# Patient Record
Sex: MALE | Race: OTHER | Employment: PART TIME | URBAN - METROPOLITAN AREA
[De-identification: names, ages, dates, MRNs, and addresses within clinical notes are randomized per-mention and may not be internally consistent; named-entity substitution may affect disease eponyms.]

---

## 2018-10-26 ENCOUNTER — HOSPITAL ENCOUNTER (EMERGENCY)
Age: 53
Discharge: HOME | End: 2018-10-26
Attending: EMERGENCY MEDICINE
Payer: SELF-PAY

## 2018-10-26 VITALS
TEMPERATURE: 98.5 F | SYSTOLIC BLOOD PRESSURE: 151 MMHG | DIASTOLIC BLOOD PRESSURE: 90 MMHG | OXYGEN SATURATION: 100 % | HEART RATE: 71 BPM | WEIGHT: 145.5 LBS | RESPIRATION RATE: 16 BRPM

## 2018-10-26 DIAGNOSIS — S61.209A AVULSION OF FINGER TIP, INITIAL ENCOUNTER: Primary | ICD-10-CM

## 2018-10-26 DIAGNOSIS — Z23 NEED FOR TETANUS BOOSTER: ICD-10-CM

## 2018-10-26 PROCEDURE — 90471 IMMUNIZATION ADMIN: CPT

## 2018-10-26 PROCEDURE — 99282 EMERGENCY DEPT VISIT SF MDM: CPT

## 2018-10-26 PROCEDURE — 90715 TDAP VACCINE 7 YRS/> IM: CPT | Performed by: PHYSICIAN ASSISTANT

## 2018-10-26 PROCEDURE — 75810000283 HC INJECTION NERVE BLOCK

## 2018-10-26 PROCEDURE — 74011250636 HC RX REV CODE- 250/636: Performed by: PHYSICIAN ASSISTANT

## 2018-10-26 RX ORDER — LIDOCAINE HYDROCHLORIDE 20 MG/ML
5 INJECTION, SOLUTION EPIDURAL; INFILTRATION; INTRACAUDAL; PERINEURAL ONCE
Status: COMPLETED | OUTPATIENT
Start: 2018-10-26 | End: 2018-10-26

## 2018-10-26 RX ADMIN — TETANUS TOXOID, REDUCED DIPHTHERIA TOXOID AND ACELLULAR PERTUSSIS VACCINE, ADSORBED 0.5 ML: 5; 2.5; 8; 8; 2.5 SUSPENSION INTRAMUSCULAR at 12:53

## 2018-10-26 RX ADMIN — LIDOCAINE HYDROCHLORIDE 100 MG: 20 INJECTION, SOLUTION INTRAVENOUS at 12:53

## 2018-10-26 NOTE — LETTER
[address] 
[facility] EMERGENCY DEPT 
[address] [place] [address] [id number] [phone] Work/School Note Date: 10/26/2018 To Whom It May concern: 
 
Kip Austin was seen and treated today in the emergency room by the following provider(s): 
Attending Provider: Angel Cheema MD 
Physician Assistant: Darshana Morse PA-C. Kip Austin may return to work on 29 October 2018. Sincerely, Rose Connors PA-C

## 2018-10-26 NOTE — ED PROVIDER NOTES
EMERGENCY DEPARTMENT HISTORY AND PHYSICAL EXAM 
 
 
Date: 10/26/2018 Patient Name: Ignacia Gambino History of Presenting Illness Chief Complaint Patient presents with  Laceration  
  cut the second finger with a saw at home referred [facility] bleeding controlled History Provided By: Patient and friend () HPI: Ignacia Gambino, 48 y.o. male with no significant PMHx presents ambulatory with friend to the ED with cc of left index finger injury. Patient was using a saw at home and the saw slipped and cut the pad of his left index finger. He states that the skin flapped off, but they put the skin back on. [facility] went to [facility], but were referred to the ED for further treatment. Patient is unsure of when his last tetanus shot was. No other medical history or allergies. Patient took Ibuprofen PTA. Patient is right hand dominant. Chief Complaint: left index finger injury Duration: PTA Timing:  Acute Location: left index finger Quality: Aching Severity: 5 out of 10 Modifying Factors: none Associated Symptoms: denies any other associated signs or symptoms There are no other complaints, changes, or physical findings at this time. PCP: None Past History Past Medical History: No past medical history on file. Past Surgical History: No past surgical history on file. Family History: No family history on file. Social History: 
Social History Tobacco Use  Smoking status: Not on file Substance Use Topics  Alcohol use: Not on file  Drug use: Not on file Allergies: 
No Known Allergies Review of Systems Review of Systems Constitutional: Negative for chills, diaphoresis and fever. HENT: Negative for rhinorrhea and sore throat. Eyes: Negative for pain. Respiratory: Negative for shortness of breath, wheezing and stridor. Cardiovascular: Negative for chest pain and leg swelling. Gastrointestinal: Negative for abdominal pain, diarrhea, nausea and vomiting. Genitourinary: Negative for difficulty urinating, dysuria, flank pain and hematuria. Musculoskeletal: Negative for back pain and neck stiffness. Skin: Positive for wound. Negative for rash. Neurological: Negative for dizziness, seizures, syncope, weakness, light-headedness and headaches. Psychiatric/Behavioral: Negative for behavioral problems and confusion. Physical Exam  
Physical Exam  
Constitutional: He is oriented to person, place, and time. He appears well-developed and well-nourished. No distress. HENT:  
Head: Normocephalic and atraumatic. Right Ear: External ear normal.  
Left Ear: External ear normal.  
Nose: Nose normal.  
Eyes: Conjunctivae and EOM are normal. Right eye exhibits no discharge. Left eye exhibits no discharge. No scleral icterus. Neck: Normal range of motion. Neck supple. Cardiovascular: Normal rate, regular rhythm and normal heart sounds. No murmur heard. Pulmonary/Chest: Effort normal and breath sounds normal. No stridor. No respiratory distress. He has no decreased breath sounds. He has no wheezes. Abdominal: Soft. Bowel sounds are normal. He exhibits no distension. There is no tenderness. There is no rebound. Musculoskeletal: Normal range of motion. He exhibits tenderness. He exhibits no edema. Hands: 
Neurological: He is alert and oriented to person, place, and time. Skin: Skin is warm and dry. No rash noted. He is not diaphoretic. Psychiatric: He has a normal mood and affect. Judgment normal.  
Nursing note and vitals reviewed. Diagnostic Study Results Labs - No results found for this or any previous visit (from the past 12 hour(s)). Radiologic Studies - Medical Decision Making I am the first provider for this patient.  
 
I reviewed the vital signs, available nursing notes, past medical history, past surgical history, family history and social history. Vital Signs-Reviewed the patient's vital signs. Patient Vitals for the past 12 hrs: 
 Temp Pulse Resp BP SpO2  
10/26/18 1156 98.5 °F (36.9 °C) 71 16 151/90 100 % Records Reviewed: Nursing Notes and Old Medical Records Provider Notes (Medical Decision Making):  
Patient presents with laceration. DDx: simple laceration vs complex laceration (open fracture, foreign body retention). Will perform repair, update tetanus, and explore wound. Follow up plan and return instructions have been reviewed and discussed with the patient. The patient has had the opportunity to ask questions about their care. The patient expresses understanding and agreement with diagnosis, follow up and return instructions. The patient agrees to return for suture removal in the discussed time period and expresses understanding that leaving sutures in place for longer periods will lead to scarring and infection. The patient expresses understanding that although appropriate care was taken in wound management that scarring and infection can still occur. ED Course:  
Initial assessment performed. The patients presenting problems have been discussed, and they are in agreement with the care plan formulated and outlined with them. I have encouraged them to ask questions as they arise throughout their visit. Procedure Note - Digital Block:  
12:15 PM 
Performed by: Dennis Love PA-C Lidocaine 2% without epinephrine used to perform digital block of Left index finger(s). The procedure took 10 minutes, and pt tolerated well. Procedure Note - Laceration Repair: 
12:30 PM 
Procedure by Dennis Love PA-C. Complexity: Simple Avulsion of dorsal fat pad on left index finger was irrigated copiously with NS under jet lavage, prepped with Hibiclens and draped in a sterile fashion.   The area was anesthetized with 4 mLs of  Lidocaine 2% without epinephrine via digital block. The wound was explored with the following results: No foreign bodies found, No tendon laceration seen. The wound was dressed with Xeroform and sterile dressing applied. Estimated blood loss: 5 mL The procedure took 20 minutes, and pt tolerated well. Disposition: 
DISCHARGE NOTE: 
4:50 PM 
The care plan has been outline with the patient and/or family, who verbally conveyed understanding and agreement. Available results have been reviewed. Patient and/or family understand the follow up plan as outlined and discharge instructions. Should their condition deterioration at any time after discharge the patient agrees to return, follow up sooner than outlined or seek medical assistance at the closest Emergency Room as soon as possible. Questions have been answered. Discharge instructions and educational information regarding the patient's diagnosis as well a list of reasons why the patient would want to seek immediate medical attention, should their condition change, were reviewed directly with the patient/family PLAN: 
1. Discharge home 2. Medications as directed 3. Schedule f/u with Orthopedic Surgeon if problem persists 4. Return precautions reviewed There are no discharge medications for this patient. 5.  
Follow-up Information Follow up With Specialties Details Why Contact Info Establish PCP      
 [facility] EMERGENCY DEPT Emergency Medicine  As needed, If symptoms worsen [address] [facility] [address] [facility] 
[phone] Return to ED if worse Diagnosis Clinical Impression: 1. Avulsion of finger tip, initial encounter 2. Need for tetanus booster This note will not be viewable in [address]

## 2018-10-26 NOTE — ED NOTES
RADHA SPANGLER reviewed discharge instructions with the patient and friend at bedside. Patient ambulatory out of treatment area with steady gait